# Patient Record
Sex: MALE | Race: BLACK OR AFRICAN AMERICAN | NOT HISPANIC OR LATINO | URBAN - METROPOLITAN AREA
[De-identification: names, ages, dates, MRNs, and addresses within clinical notes are randomized per-mention and may not be internally consistent; named-entity substitution may affect disease eponyms.]

---

## 2017-06-09 ENCOUNTER — EMERGENCY (EMERGENCY)
Facility: HOSPITAL | Age: 27
LOS: 1 days | Discharge: HOME | End: 2017-06-09
Attending: EMERGENCY MEDICINE | Admitting: EMERGENCY MEDICINE
Payer: MEDICAID

## 2017-06-09 VITALS
RESPIRATION RATE: 16 BRPM | SYSTOLIC BLOOD PRESSURE: 108 MMHG | HEART RATE: 80 BPM | TEMPERATURE: 99 F | DIASTOLIC BLOOD PRESSURE: 70 MMHG | OXYGEN SATURATION: 100 %

## 2017-06-09 DIAGNOSIS — K13.0 DISEASES OF LIPS: ICD-10-CM

## 2017-06-09 DIAGNOSIS — S00.561A INSECT BITE (NONVENOMOUS) OF LIP, INITIAL ENCOUNTER: ICD-10-CM

## 2017-06-09 PROCEDURE — 99284 EMERGENCY DEPT VISIT MOD MDM: CPT | Mod: 25

## 2017-06-09 RX ORDER — FAMOTIDINE 10 MG/ML
1 INJECTION INTRAVENOUS
Qty: 7 | Refills: 0 | OUTPATIENT
Start: 2017-06-09 | End: 2017-06-16

## 2017-06-09 RX ORDER — FAMOTIDINE 10 MG/ML
20 INJECTION INTRAVENOUS ONCE
Qty: 0 | Refills: 0 | Status: COMPLETED | OUTPATIENT
Start: 2017-06-09 | End: 2017-06-09

## 2017-06-09 RX ORDER — DIPHENHYDRAMINE HCL 50 MG
50 CAPSULE ORAL ONCE
Qty: 0 | Refills: 0 | Status: COMPLETED | OUTPATIENT
Start: 2017-06-09 | End: 2017-06-09

## 2017-06-09 RX ORDER — CETIRIZINE HYDROCHLORIDE 10 MG/1
1 TABLET ORAL
Qty: 12 | Refills: 0 | OUTPATIENT
Start: 2017-06-09

## 2017-06-09 RX ORDER — DEXAMETHASONE 0.5 MG/5ML
10 ELIXIR ORAL ONCE
Qty: 0 | Refills: 0 | Status: COMPLETED | OUTPATIENT
Start: 2017-06-09 | End: 2017-06-09

## 2017-06-09 RX ADMIN — Medication 10 MILLIGRAM(S): at 07:03

## 2017-06-09 RX ADMIN — FAMOTIDINE 20 MILLIGRAM(S): 10 INJECTION INTRAVENOUS at 07:03

## 2017-06-09 RX ADMIN — Medication 50 MILLIGRAM(S): at 07:03

## 2017-06-09 NOTE — ED PROVIDER NOTE - MEDICAL DECISION MAKING DETAILS
pt with localized allergic reaction to unknown insect bite, AFVSS and phonating well, no respiratory involvement, given steroids and antihistamine with improvement, pt non-toxic appearing and hemodynamically stable, results, ddx, and f/u plans discussed with pt at bedside, d/c'd home to f/u with PMD, strict return precautions discussed, prompt return to ER for any worsening or new sx, pt verbalized understanding.

## 2017-06-09 NOTE — ED PROVIDER NOTE - CARE PLAN
Principal Discharge DX:	Allergic reaction, initial encounter  Secondary Diagnosis:	Insect bite, initial encounter

## 2017-06-09 NOTE — ED PROVIDER NOTE - OBJECTIVE STATEMENT
28 yo M with no known PMHx, presenting c/o lower lip swelling s/p insect bite.  Pt was bite in the lower lip by unknown insect 2 hrs PTA to the ED, noted progressive worsening swelling and slight pain to the region.  Denies fever, chills, SOB, CP, palpitations, wheezing, stridors, tongue swelling, focal weakness, HA, dizziness, N/V/D/C, cough, paresthesia, numbness, tingling, malaise, and diaphoresis.  No new products/meds/food reported.

## 2017-06-09 NOTE — ED PROVIDER NOTE - DETAILS:
I was present in the ED for immediate consultation.  PA notes reviewed - agree with assessment and plan.

## 2017-06-09 NOTE — ED PROVIDER NOTE - ENMT, MLM
Airway patent, Nasal mucosa clear. Mouth with normal mucosa. Lower lip with mild edema, no laceration, puncture wound, d/c, or ulceration noted, Throat has no vesicles, no oropharyngeal exudates and uvula is midline. phonating well, no drooling or pooling of saliva

## 2023-04-24 NOTE — ED PROVIDER NOTE - NS ED MD EM SELECTION
Medication requested: sertraline (ZOLOFT) 50 MG tablet  Last office visit: 7/22/2022  [facility] appointments: none  Medication last refilled: 10/31/2022; 90 + 1 refill  Last qualifying labs:      7/22/2022  9:20 AM   Last PHQ-9    PHQ-9 Total Score 0        Prescription approved per [facility] Refill Protocol.    BRITANY Banuelos, RN  04/24/23, 2:08 PM      
[id number] Detailed